# Patient Record
Sex: MALE | Race: WHITE | NOT HISPANIC OR LATINO | URBAN - METROPOLITAN AREA
[De-identification: names, ages, dates, MRNs, and addresses within clinical notes are randomized per-mention and may not be internally consistent; named-entity substitution may affect disease eponyms.]

---

## 2020-12-22 ENCOUNTER — EMERGENCY (EMERGENCY)
Facility: HOSPITAL | Age: 60
LOS: 1 days | Discharge: HOME | End: 2020-12-22
Attending: EMERGENCY MEDICINE
Payer: MEDICARE

## 2020-12-22 VITALS
DIASTOLIC BLOOD PRESSURE: 78 MMHG | HEIGHT: 72 IN | OXYGEN SATURATION: 99 % | HEART RATE: 66 BPM | TEMPERATURE: 98 F | SYSTOLIC BLOOD PRESSURE: 113 MMHG | WEIGHT: 191.8 LBS | RESPIRATION RATE: 18 BRPM

## 2020-12-22 LAB
ALBUMIN SERPL ELPH-MCNC: 3.4 G/DL — LOW (ref 3.5–5)
ALP SERPL-CCNC: 60 U/L — SIGNIFICANT CHANGE UP (ref 40–120)
ALT FLD-CCNC: 25 U/L DA — SIGNIFICANT CHANGE UP (ref 10–60)
ANION GAP SERPL CALC-SCNC: 9 MMOL/L — SIGNIFICANT CHANGE UP (ref 5–17)
APAP SERPL-MCNC: <2 UG/ML — LOW (ref 10–30)
APTT BLD: 32.9 SEC — SIGNIFICANT CHANGE UP (ref 27.5–35.5)
AST SERPL-CCNC: 18 U/L — SIGNIFICANT CHANGE UP (ref 10–40)
BASOPHILS # BLD AUTO: 0.04 K/UL — SIGNIFICANT CHANGE UP (ref 0–0.2)
BASOPHILS NFR BLD AUTO: 0.6 % — SIGNIFICANT CHANGE UP (ref 0–2)
BILIRUB SERPL-MCNC: 0.5 MG/DL — SIGNIFICANT CHANGE UP (ref 0.2–1.2)
BUN SERPL-MCNC: 18 MG/DL — SIGNIFICANT CHANGE UP (ref 7–18)
CALCIUM SERPL-MCNC: 8.4 MG/DL — SIGNIFICANT CHANGE UP (ref 8.4–10.5)
CHLORIDE SERPL-SCNC: 104 MMOL/L — SIGNIFICANT CHANGE UP (ref 96–108)
CO2 SERPL-SCNC: 26 MMOL/L — SIGNIFICANT CHANGE UP (ref 22–31)
CREAT SERPL-MCNC: 1.3 MG/DL — SIGNIFICANT CHANGE UP (ref 0.5–1.3)
EOSINOPHIL # BLD AUTO: 0.05 K/UL — SIGNIFICANT CHANGE UP (ref 0–0.5)
EOSINOPHIL NFR BLD AUTO: 0.7 % — SIGNIFICANT CHANGE UP (ref 0–6)
ETHANOL SERPL-MCNC: 5 MG/DL — SIGNIFICANT CHANGE UP (ref 0–10)
GLUCOSE SERPL-MCNC: 139 MG/DL — HIGH (ref 70–99)
HCT VFR BLD CALC: 42.9 % — SIGNIFICANT CHANGE UP (ref 39–50)
HGB BLD-MCNC: 14.2 G/DL — SIGNIFICANT CHANGE UP (ref 13–17)
IMM GRANULOCYTES NFR BLD AUTO: 0.3 % — SIGNIFICANT CHANGE UP (ref 0–1.5)
INR BLD: 1.02 RATIO — SIGNIFICANT CHANGE UP (ref 0.88–1.16)
LACTATE SERPL-SCNC: 2.2 MMOL/L — HIGH (ref 0.7–2)
LYMPHOCYTES # BLD AUTO: 1.43 K/UL — SIGNIFICANT CHANGE UP (ref 1–3.3)
LYMPHOCYTES # BLD AUTO: 20 % — SIGNIFICANT CHANGE UP (ref 13–44)
MAGNESIUM SERPL-MCNC: 2.1 MG/DL — SIGNIFICANT CHANGE UP (ref 1.6–2.6)
MCHC RBC-ENTMCNC: 28.4 PG — SIGNIFICANT CHANGE UP (ref 27–34)
MCHC RBC-ENTMCNC: 33.1 GM/DL — SIGNIFICANT CHANGE UP (ref 32–36)
MCV RBC AUTO: 85.8 FL — SIGNIFICANT CHANGE UP (ref 80–100)
MONOCYTES # BLD AUTO: 0.5 K/UL — SIGNIFICANT CHANGE UP (ref 0–0.9)
MONOCYTES NFR BLD AUTO: 7 % — SIGNIFICANT CHANGE UP (ref 2–14)
NEUTROPHILS # BLD AUTO: 5.11 K/UL — SIGNIFICANT CHANGE UP (ref 1.8–7.4)
NEUTROPHILS NFR BLD AUTO: 71.4 % — SIGNIFICANT CHANGE UP (ref 43–77)
NRBC # BLD: 0 /100 WBCS — SIGNIFICANT CHANGE UP (ref 0–0)
PHOSPHATE SERPL-MCNC: 2.4 MG/DL — LOW (ref 2.5–4.5)
PLATELET # BLD AUTO: 227 K/UL — SIGNIFICANT CHANGE UP (ref 150–400)
POTASSIUM SERPL-MCNC: 3.9 MMOL/L — SIGNIFICANT CHANGE UP (ref 3.5–5.3)
POTASSIUM SERPL-SCNC: 3.9 MMOL/L — SIGNIFICANT CHANGE UP (ref 3.5–5.3)
PROT SERPL-MCNC: 7 G/DL — SIGNIFICANT CHANGE UP (ref 6–8.3)
PROTHROM AB SERPL-ACNC: 12.1 SEC — SIGNIFICANT CHANGE UP (ref 10.6–13.6)
RBC # BLD: 5 M/UL — SIGNIFICANT CHANGE UP (ref 4.2–5.8)
RBC # FLD: 13.1 % — SIGNIFICANT CHANGE UP (ref 10.3–14.5)
SALICYLATES SERPL-MCNC: <1.7 MG/DL — LOW (ref 2.8–20)
SODIUM SERPL-SCNC: 139 MMOL/L — SIGNIFICANT CHANGE UP (ref 135–145)
TSH SERPL-MCNC: 1.35 UU/ML — SIGNIFICANT CHANGE UP (ref 0.34–4.82)
WBC # BLD: 7.15 K/UL — SIGNIFICANT CHANGE UP (ref 3.8–10.5)
WBC # FLD AUTO: 7.15 K/UL — SIGNIFICANT CHANGE UP (ref 3.8–10.5)

## 2020-12-22 PROCEDURE — 70450 CT HEAD/BRAIN W/O DYE: CPT | Mod: 26

## 2020-12-22 PROCEDURE — 71045 X-RAY EXAM CHEST 1 VIEW: CPT | Mod: 26

## 2020-12-22 RX ORDER — SODIUM CHLORIDE 9 MG/ML
1000 INJECTION INTRAMUSCULAR; INTRAVENOUS; SUBCUTANEOUS ONCE
Refills: 0 | Status: COMPLETED | OUTPATIENT
Start: 2020-12-22 | End: 2020-12-22

## 2020-12-22 RX ADMIN — SODIUM CHLORIDE 1000 MILLILITER(S): 9 INJECTION INTRAMUSCULAR; INTRAVENOUS; SUBCUTANEOUS at 22:21

## 2020-12-22 NOTE — ED PROVIDER NOTE - NSFOLLOWUPINSTRUCTIONS_ED_ALL_ED_FT
Take medication as prescribed.  Followup with your neurologist or neurology specialist above for reevaluation.  Return to ED if you develop recurrent seizures, lethargy or confusion.      New-Onset Seizure in Adults    WHAT YOU NEED TO KNOW:    A seizure is a burst of electrical activity in your brain. A seizure may start in one part of your brain, or both sides may be affected. The seizure may last a few seconds or up to 5 minutes. A new-onset seizure is a seizure that happens for the first time. Some common triggers are alcohol, drugs, lack of sleep, fever, or an infection. High or low blood sugar levels, pregnancy, a head injury, or a stroke could also trigger a seizure. The cause of your seizure may not be known. You have a higher risk for another seizure within the next 2 years.    DISCHARGE INSTRUCTIONS:    Call your local emergency number (911 in the ) or have someone else call for any of the following:   •Your seizure lasts longer than 5 minutes.      •You have a second seizure that happens within 24 hours of your first.      •You have trouble breathing after a seizure.      •You cannot be woken after your seizure.      •You have more than 1 seizure before you are fully awake or aware.      Call your doctor if:   •You are injured during a seizure.      •You have a fever.      •You are planning to get pregnant or are currently pregnant.      •You have questions or concerns about your condition or care.      Medicines: You may be given the following:   •Antiepileptic medicine may control or prevent another seizure. Do not stop taking this medicine without direction from a healthcare provider.      •Antibiotics treat an infection caused by bacteria.      •Take your medicine as directed. Contact your healthcare provider if you think your medicine is not helping or if you have side effects. Tell him or her if you are allergic to any medicine. Keep a list of the medicines, vitamins, and herbs you take. Include the amounts, and when and why you take them. Bring the list or the pill bottles to follow-up visits. Carry your medicine list with you in case of an emergency.      What you can do to manage or prevent a seizure:   •Manage stress. Stress can trigger a seizure. Exercise can help you reduce stress. Talk to your healthcare provider about exercise that is safe for you. Other ways to manage stress include yoga, meditation, and biofeedback. Illness can be a form of stress. Eat a variety of healthy foods and drink plenty of liquids during an illness.      •Set a regular sleep schedule. A lack of sleep can trigger a seizure. Try to go to sleep and wake up at the same times every day. Keep your bedroom quiet and dark. Talk to your healthcare provider if you are having trouble sleeping.      •Manage other medical conditions. Manage other health conditions that may increase your risk for a seizure. Keep your blood sugar levels and blood pressure under control.      •Limit or do not drink alcohol as directed. Alcohol can trigger a seizure, especially if you drink a large amount at one time. A drink of alcohol is 12 ounces of beer, 1½ ounces of liquor, or 5 ounces of wine. Talk to your healthcare provider about a safe amount of alcohol for you. Your provider may recommend that you do not drink any alcohol. Tell him or her if you need help to quit drinking.      •Ask what safety precautions you should take. Talk with your healthcare provider about driving. You may not be able to drive until you are seizure-free for a period of time. You will need to check the law where you live. Also talk to your healthcare provider about swimming and bathing. You may drown or develop life-threatening heart or lung damage if you have a seizure in water.      •Tell your friends, family members, and coworkers that you had a seizure. Give them written instructions to follow if you have another seizure.      Follow up with your healthcare provider or neurologist as directed: You may need more tests to find the cause of your seizure. Write down your questions so you remember to ask them during your visits.

## 2020-12-22 NOTE — ED PROVIDER NOTE - PATIENT PORTAL LINK FT
You can access the FollowMyHealth Patient Portal offered by [facility] by registering at the following website: [email or web address]. By joining [facility]’s FollowMyHealth portal, you will also be able to view your health information using other applications (apps) compatible with our system.

## 2020-12-22 NOTE — ED ADULT NURSE NOTE - NSIMPLEMENTINTERV_GEN_ALL_ED
Implemented All Fall Risk Interventions:  [place] to call system. Call bell, personal items and telephone within reach. Instruct patient to call for assistance. Room bathroom lighting operational. Non-slip footwear when patient is off stretcher. Physically safe environment: no spills, clutter or unnecessary equipment. Stretcher in lowest position, wheels locked, appropriate side rails in place. Provide visual cue, wrist band, yellow gown, etc. Monitor gait and stability. Monitor for mental status changes and reorient to person, place, and time. Review medications for side effects contributing to fall risk. Reinforce activity limits and safety measures with patient and family.

## 2020-12-22 NOTE — ED ADULT TRIAGE NOTE - CHIEF COMPLAINT QUOTE
Patient states" I was around with my son and his friends smoking and I passed out "   As per son patient turned pale and clenched and have seizures. Was incontinent of urine.

## 2020-12-22 NOTE — ED PROVIDER NOTE - NSFOLLOWUPCLINICS_GEN_ALL_ED_FT
[facility]  Neurology  [address] [place] [address]  Phone: [phone]  Fax: [phone]  Follow Up Time:

## 2020-12-22 NOTE — ED ADULT NURSE NOTE - OBJECTIVE STATEMENT
The patient BIBEMS for suspected seizure activity.  As per the patient and EMS, the patient was walking up stairs with his son and was exposed to second hand marijuana (?) and the passed out.  The patient sustained urinary incontinence. Preliminary,  No visible injuries noted. The patient BIBEMS for suspected seizure activity.  As per the patient and EMS, the patient was walking up stairs with his son and was exposed to second hand marijuana (?) and the passed out.  The patient sustained urinary incontinence. Preliminary,  No visible injuries noted.  pt c/o dizziness and weakness.  No neuro deficits.

## 2020-12-22 NOTE — ED PROVIDER NOTE - OBJECTIVE STATEMENT
61 y/o male h/o rheumatoid arthritis (on hydroxychloroquine and prednisone), myopathy, neuropathy, stroke seen on MRI (denies CVA sx's), presents after seizure. Pt reports he was with his son and friends, who at the time was smoking what he believes to be marijuana, when suddenly he became unresponsive and when he woke up EMS was on scene and brought him to the ER. Pt denies any hx of seizures in the past. As per EMS, the pt's son reported that the pt had suddenly become unresponsive, was clenching his teeth, shaking his arms above his head, and urinated on himself. Pt reports that he has been in his usual state of telma. He reports that two days ago he had mild lightheadedness, described as "just not feeling right," but that spontaneously resolved. Pt reports the next day he had a mild headache which resolved after ibuprofen. Today denies any new weakness, numbness, difficulty speaking, recent fevers, cough, or N/V/D. He currently reports he has a burning sensation over his chest and throat.

## 2020-12-22 NOTE — ED PROVIDER NOTE - CLINICAL SUMMARY MEDICAL DECISION MAKING FREE TEXT BOX
59 y/o male h/o rheumatoid arthritis (on hydroxychloroquine and prednisone), myopathy, neuropathy, stroke seen on MRI (denies CVA sx's), presents after seizure. Will obtain EKG, labs, UA, Utox, CXR, and CT head. 61 y/o male h/o rheumatoid arthritis (on hydroxychloroquine and prednisone), myopathy, neuropathy, stroke seen on MRI (denies CVA sx's), presents after seizure. Will obtain EKG, labs, UA, Utox, CXR, and CT head.    ekg, labs, CXR unremarkable, Utox positive for THC  CThead shows No acute findings on noncontrast CT of the brain.  @1215am discussed above with Dr. caputo neurologist who recommends keppra 1gram in ED and rx 500mg twice daily and outpatient followup.  Discussed above with patient and brother Gareth over the phone who agrees with discharge and outpatient followup. Patient reports he has appointment with his private neurologist on 12/28/20. patient declines keppra loading dose in ED but accepts rx which he reports he will start taking after discussion with his neurologist. Patient stable for discharge at this time.

## 2020-12-23 VITALS
SYSTOLIC BLOOD PRESSURE: 120 MMHG | OXYGEN SATURATION: 98 % | DIASTOLIC BLOOD PRESSURE: 72 MMHG | HEART RATE: 63 BPM | RESPIRATION RATE: 18 BRPM | TEMPERATURE: 98 F

## 2020-12-23 LAB
AMPHET UR-MCNC: NEGATIVE — SIGNIFICANT CHANGE UP
APPEARANCE UR: CLEAR — SIGNIFICANT CHANGE UP
BARBITURATES UR SCN-MCNC: NEGATIVE — SIGNIFICANT CHANGE UP
BENZODIAZ UR-MCNC: NEGATIVE — SIGNIFICANT CHANGE UP
BILIRUB UR-MCNC: NEGATIVE — SIGNIFICANT CHANGE UP
COCAINE METAB.OTHER UR-MCNC: NEGATIVE — SIGNIFICANT CHANGE UP
COLOR SPEC: YELLOW — SIGNIFICANT CHANGE UP
DIFF PNL FLD: NEGATIVE — SIGNIFICANT CHANGE UP
GLUCOSE UR QL: NEGATIVE — SIGNIFICANT CHANGE UP
KETONES UR-MCNC: NEGATIVE — SIGNIFICANT CHANGE UP
LEUKOCYTE ESTERASE UR-ACNC: NEGATIVE — SIGNIFICANT CHANGE UP
METHADONE UR-MCNC: NEGATIVE — SIGNIFICANT CHANGE UP
NITRITE UR-MCNC: NEGATIVE — SIGNIFICANT CHANGE UP
OPIATES UR-MCNC: NEGATIVE — SIGNIFICANT CHANGE UP
PCP SPEC-MCNC: SIGNIFICANT CHANGE UP
PCP UR-MCNC: NEGATIVE — SIGNIFICANT CHANGE UP
PH UR: 8 — SIGNIFICANT CHANGE UP (ref 5–8)
PROT UR-MCNC: 15
SP GR SPEC: 1.01 — SIGNIFICANT CHANGE UP (ref 1.01–1.02)
THC UR QL: POSITIVE
UROBILINOGEN FLD QL: 1

## 2020-12-23 PROCEDURE — 70450 CT HEAD/BRAIN W/O DYE: CPT

## 2020-12-23 PROCEDURE — 85730 THROMBOPLASTIN TIME PARTIAL: CPT

## 2020-12-23 PROCEDURE — 84100 ASSAY OF PHOSPHORUS: CPT

## 2020-12-23 PROCEDURE — 93005 ELECTROCARDIOGRAM TRACING: CPT

## 2020-12-23 PROCEDURE — 84443 ASSAY THYROID STIM HORMONE: CPT

## 2020-12-23 PROCEDURE — 85610 PROTHROMBIN TIME: CPT

## 2020-12-23 PROCEDURE — 36415 COLL VENOUS BLD VENIPUNCTURE: CPT

## 2020-12-23 PROCEDURE — 71045 X-RAY EXAM CHEST 1 VIEW: CPT

## 2020-12-23 PROCEDURE — 82962 GLUCOSE BLOOD TEST: CPT

## 2020-12-23 PROCEDURE — 99285 EMERGENCY DEPT VISIT HI MDM: CPT

## 2020-12-23 PROCEDURE — 99284 EMERGENCY DEPT VISIT MOD MDM: CPT | Mod: 25

## 2020-12-23 PROCEDURE — 83605 ASSAY OF LACTIC ACID: CPT

## 2020-12-23 PROCEDURE — 83735 ASSAY OF MAGNESIUM: CPT

## 2020-12-23 PROCEDURE — 80307 DRUG TEST PRSMV CHEM ANLYZR: CPT

## 2020-12-23 PROCEDURE — 85025 COMPLETE CBC W/AUTO DIFF WBC: CPT

## 2020-12-23 PROCEDURE — 80053 COMPREHEN METABOLIC PANEL: CPT

## 2020-12-23 PROCEDURE — 84484 ASSAY OF TROPONIN QUANT: CPT

## 2020-12-23 PROCEDURE — 87086 URINE CULTURE/COLONY COUNT: CPT

## 2020-12-23 PROCEDURE — 81001 URINALYSIS AUTO W/SCOPE: CPT

## 2020-12-23 RX ORDER — LEVETIRACETAM 250 MG/1
1 TABLET, FILM COATED ORAL
Qty: 30 | Refills: 0
Start: 2020-12-23 | End: 2021-01-06

## 2020-12-24 LAB
CULTURE RESULTS: SIGNIFICANT CHANGE UP
SPECIMEN SOURCE: SIGNIFICANT CHANGE UP

## 2022-04-08 NOTE — ED ADULT NURSE NOTE - HIV OFFER
When PA was started, we did not have member ID for medication insurance. With [employer], this is a completely different part of insurance. I have contacted patient via [employer] to get the member ID of the rx card.     When I have received that I will start the PA process.   Opt out